# Patient Record
Sex: FEMALE | NOT HISPANIC OR LATINO | URBAN - METROPOLITAN AREA
[De-identification: names, ages, dates, MRNs, and addresses within clinical notes are randomized per-mention and may not be internally consistent; named-entity substitution may affect disease eponyms.]

---

## 2018-03-13 ENCOUNTER — APPOINTMENT (RX ONLY)
Dept: URBAN - METROPOLITAN AREA CLINIC 167 | Facility: CLINIC | Age: 78
Setting detail: DERMATOLOGY
End: 2018-03-13

## 2018-03-13 DIAGNOSIS — L50.8 OTHER URTICARIA: ICD-10-CM

## 2018-03-13 PROCEDURE — ? OTHER

## 2018-03-13 PROCEDURE — 99202 OFFICE O/P NEW SF 15 MIN: CPT

## 2018-03-13 PROCEDURE — ? COUNSELING

## 2018-03-13 PROCEDURE — ? TREATMENT REGIMEN

## 2018-03-13 ASSESSMENT — LOCATION ZONE DERM
LOCATION ZONE: ARM
LOCATION ZONE: NECK
LOCATION ZONE: TRUNK

## 2018-03-13 ASSESSMENT — LOCATION SIMPLE DESCRIPTION DERM
LOCATION SIMPLE: RIGHT FOREARM
LOCATION SIMPLE: LEFT FOREARM
LOCATION SIMPLE: POSTERIOR NECK
LOCATION SIMPLE: LEFT UPPER BACK

## 2018-03-13 ASSESSMENT — LOCATION DETAILED DESCRIPTION DERM
LOCATION DETAILED: LEFT SUPERIOR MEDIAL UPPER BACK
LOCATION DETAILED: LEFT DISTAL DORSAL FOREARM
LOCATION DETAILED: MID POSTERIOR NECK
LOCATION DETAILED: RIGHT DISTAL DORSAL FOREARM

## 2018-03-13 NOTE — PROCEDURE: OTHER
Detail Level: Detailed
Note Text (......Xxx Chief Complaint.): This diagnosis correlates with the
Other (Free Text): Has \"hives\" on and off since 6/17\\nOn neck, hands, arms, wrist\\nItchy \\nTakes antihistamines every day, such as zyrtec x a couple of weeks\\nWorked better than claritin\\nWhen it gets worse, she will take Benadryl \\nDoes not make her sleepy\\nDoesn’t work as well\\n\\nLook like small bites, only gets 3-4 at the same time in the same area - I suspect bug bites\\n\\nNo new meds\\nNo new soaps\\nHasn’t gone on trips \\nNo new furniture\\nHasn’t tried new foods\\nLeaves windows open\\nNo standing water\\nHas a cat, is indoors\\n\\nWas using wool drier balls. \\nTook them out for 2-3 weeks and it didn’t make a difference. \\n\\nPatient does had nodules on her thyroid for about a year.\\n\\nCBC, TSH, T3, TPO normal\\nIgE 173 (slightly elevated)\\nIgE receptor Ab negative\\n\\n[place] seasonal allergies[place] from [place]\\nShe did not have seasonal allergies then either

## 2018-03-13 NOTE — PROCEDURE: TREATMENT REGIMEN
Detail Level: Zone
Initiate Treatment: Patient states she has triamcinolone cream at home.
Plan: Recommended Zyrtec or xyzal every day

## 2018-04-09 ENCOUNTER — APPOINTMENT (RX ONLY)
Dept: URBAN - METROPOLITAN AREA CLINIC 170 | Facility: CLINIC | Age: 78
Setting detail: DERMATOLOGY
End: 2018-04-09

## 2018-04-09 DIAGNOSIS — L29.89 OTHER PRURITUS: ICD-10-CM

## 2018-04-09 DIAGNOSIS — L50.8 OTHER URTICARIA: ICD-10-CM | Status: WORSENING

## 2018-04-09 DIAGNOSIS — L29.8 OTHER PRURITUS: ICD-10-CM

## 2018-04-09 PROBLEM — L30.9 DERMATITIS, UNSPECIFIED: Status: ACTIVE | Noted: 2018-04-09

## 2018-04-09 PROCEDURE — ? BIOPSY BY PUNCH METHOD

## 2018-04-09 PROCEDURE — ? OTHER

## 2018-04-09 PROCEDURE — ? COUNSELING

## 2018-04-09 PROCEDURE — 11100: CPT

## 2018-04-09 PROCEDURE — ? TREATMENT REGIMEN

## 2018-04-09 PROCEDURE — 99213 OFFICE O/P EST LOW 20 MIN: CPT | Mod: 25

## 2018-04-09 ASSESSMENT — LOCATION DETAILED DESCRIPTION DERM
LOCATION DETAILED: RIGHT DISTAL DORSAL FOREARM
LOCATION DETAILED: MID POSTERIOR NECK
LOCATION DETAILED: RIGHT PROXIMAL DORSAL FOREARM
LOCATION DETAILED: LEFT DISTAL DORSAL FOREARM
LOCATION DETAILED: LEFT SUPERIOR MEDIAL UPPER BACK
LOCATION DETAILED: LEFT PROXIMAL DORSAL FOREARM

## 2018-04-09 ASSESSMENT — LOCATION SIMPLE DESCRIPTION DERM
LOCATION SIMPLE: RIGHT FOREARM
LOCATION SIMPLE: LEFT UPPER BACK
LOCATION SIMPLE: LEFT FOREARM
LOCATION SIMPLE: POSTERIOR NECK

## 2018-04-09 ASSESSMENT — LOCATION ZONE DERM
LOCATION ZONE: NECK
LOCATION ZONE: ARM
LOCATION ZONE: TRUNK

## 2018-04-09 NOTE — PROCEDURE: BIOPSY BY PUNCH METHOD
Anesthesia Type: 1% lidocaine with epinephrine
Detail Level: Simple
Bill 71380 For Specimen Handling/Conveyance To Laboratory?: no
Epidermal Sutures: 4-0 Nylon
Biopsy Type: H and E
Lab: 451
Punch Size In Mm: 4
Post-Care Instructions: I reviewed with the patient in detail post-care instructions. Patient is to keep the area dry and covered with the bandage for about 24 hours.  Once the bandage is removed, the patient should wash the site with soap and water, apply Aquaphor or Vasiline and re-cover the site with a bandage once to twice daily until healed.  Keeping a thin layer of ointment on the wound (rather than letting a scab form) helps promote healing. Antibiotic ointments (Neosporin, Polysporin, Bacitracin, Triple Antibiotic) are not needed nor recommended, but may be used if they have been used more than twice in the past without developing an allergic reaction to them. Should the patient develop any fevers, chills, bleeding, severe pain, increased redness/drainage/crust/pus, patient will contact the office immediately.
Additional Anesthesia Volume In Cc (Will Not Render If 0): 0
Notification Instructions: Patient will be notified of biopsy results. However, patient instructed to call the office if not contacted within 2 weeks.
Wound Care: Aquaphor
Billing Type: Third-Party Bill
Hemostasis: None
Anesthesia Volume In Cc (Will Not Render If 0): 0.5
Home Suture Removal Text: Patient was provided a home suture removal kit and will remove their sutures at home.  If they have any questions or difficulties they will call the office.
Suture Removal: 10 days
Render Post-Care Instructions In Note?: yes
Dressing: bandage
Consent: Written consent was obtained and risks were reviewed including but not limited to scarring, infection, bleeding, scabbing, incomplete removal, nerve damage and allergy to anesthesia.

## 2018-04-09 NOTE — PROCEDURE: TREATMENT REGIMEN
Plan:  recommended to check home for bed bugs, mosquitoes, or other bugs
Continue Regimen: triamcinolone cream twice a day as needed
Detail Level: Zone
Plan: Xyrtec recommended daily

## 2018-04-09 NOTE — PROCEDURE: OTHER
Other (Free Text): Patient last seen for the same rash\\nLooked like insect bites at the last visit (3 in a row)\\nStill looks like insect bites (3 in a row, central red papule with surrounding erythema and flare, on exposed skin of arms and neck)\\n\\nGets new bumps mostly during the day\\nOften eating or at clubhouse when outside\\nIs weaning off antidepressant (sertraline) to see if that could be causing a rash\\nHas a tremor in her hand, which had also been caused by Prozac in the past\\nAlso saw that it causes hives\\n\\nStill taking Zyrtec every day\\nWorked better than Claritin\\nMay be making her a little drowsy\\nIs on the back of her neck and her arms, a little on the upper back\\n\\nOn lorazepam x years, but rarely takes it\\nOnly rx is sertraline x 5 years on and off, but on it regularly lately\\n\\nLook like small bites, only gets 3-4 at the same time in the same area - I suspect bug bites\\n\\nNo new meds\\nNo new soaps\\nHasn’t gone on trips \\nNo new furniture\\nLeaves windows open\\nNo standing water\\nHas a cat, is indoors\\nSofa is covered and she washes the covers\\n\\nFrom last visit:\\nPatient does had nodules on her thyroid for about a year.\\nCBC, TSH, T3, TPO normal\\nIgE 173 (slightly elevated)\\nIgE receptor Ab negative\\nHas seasonal allergies
Note Text (......Xxx Chief Complaint.): This diagnosis correlates with the
Detail Level: Detailed

## 2018-04-16 ENCOUNTER — APPOINTMENT (RX ONLY)
Dept: URBAN - METROPOLITAN AREA CLINIC 170 | Facility: CLINIC | Age: 78
Setting detail: DERMATOLOGY
End: 2018-04-16

## 2018-04-16 DIAGNOSIS — Z48.02 ENCOUNTER FOR REMOVAL OF SUTURES: ICD-10-CM

## 2018-04-16 DIAGNOSIS — L50.8 OTHER URTICARIA: ICD-10-CM

## 2018-04-16 PROBLEM — L30.9 DERMATITIS, UNSPECIFIED: Status: ACTIVE | Noted: 2018-04-16

## 2018-04-16 PROCEDURE — ? TREATMENT REGIMEN

## 2018-04-16 PROCEDURE — ? COUNSELING

## 2018-04-16 PROCEDURE — 99213 OFFICE O/P EST LOW 20 MIN: CPT

## 2018-04-16 PROCEDURE — ? OTHER

## 2018-04-16 PROCEDURE — ? PRESCRIPTION

## 2018-04-16 PROCEDURE — ? SUTURE REMOVAL (NO GLOBAL PERIOD)

## 2018-04-16 RX ORDER — TRIAMCINOLONE ACETONIDE 1 MG/G
CREAM TOPICAL
Qty: 1 | Refills: 3 | Status: ERX

## 2018-04-16 RX ORDER — TRIAMCINOLONE ACETONIDE 1 MG/G
CREAM TOPICAL
Qty: 1 | Refills: 3 | Status: ERX | COMMUNITY
Start: 2018-04-16

## 2018-04-16 RX ADMIN — TRIAMCINOLONE ACETONIDE: 1 CREAM TOPICAL at 20:32

## 2018-04-16 ASSESSMENT — LOCATION DETAILED DESCRIPTION DERM
LOCATION DETAILED: MID POSTERIOR NECK
LOCATION DETAILED: RIGHT DISTAL DORSAL FOREARM
LOCATION DETAILED: LEFT DISTAL DORSAL FOREARM
LOCATION DETAILED: RIGHT PROXIMAL DORSAL FOREARM
LOCATION DETAILED: LEFT SUPERIOR MEDIAL UPPER BACK

## 2018-04-16 ASSESSMENT — LOCATION SIMPLE DESCRIPTION DERM
LOCATION SIMPLE: LEFT FOREARM
LOCATION SIMPLE: RIGHT FOREARM
LOCATION SIMPLE: POSTERIOR NECK
LOCATION SIMPLE: LEFT UPPER BACK

## 2018-04-16 ASSESSMENT — LOCATION ZONE DERM
LOCATION ZONE: ARM
LOCATION ZONE: NECK
LOCATION ZONE: TRUNK

## 2018-04-16 NOTE — PROCEDURE: TREATMENT REGIMEN
Plan:  recommended to check home for bed bugs, mosquitoes, or other bugs
Continue Regimen: triamcinolone cream twice a day as needed
Detail Level: Zone

## 2018-04-16 NOTE — PROCEDURE: OTHER
Other (Free Text): Patient last seen earlier this month and in March for the rash\\nLooked like insect bites at the last two visits (3 in a row, central red papule with surrounding erythema and flare, on exposed skin of arms and neck)\\nHas not had an  look at her house, but she does not believe it is bed bugs or flying insects\\n\\nHas recently finished weaning off antidepressant (sertraline) to see if that could be causing a rash\\nToday, it is worse than I have seen it\\nWill see if d/c'ing the sertraline will help\\n\\nTaking Zyrtec every day\\n\\nOn lorazepam x years, but rarely takes it\\nOnly rx is sertraline x 5 years on and off, but on it regularly lately\\nHas not taken ranitidine recently\\nI recommend that she stay off the lorazepam, ranitidine, and all her supplements\\nWill bx today to see if there are any other clues\\n\\nFrom a previous visit:\\nPatient does had nodules on her thyroid for about a year.\\nCBC, TSH, T3, TPO normal\\nIgE 173 (slightly elevated)\\nIgE receptor Ab negative\\nHas seasonal allergies
Detail Level: Detailed
Note Text (......Xxx Chief Complaint.): This diagnosis correlates with the